# Patient Record
Sex: FEMALE | Race: WHITE | Employment: FULL TIME | URBAN - METROPOLITAN AREA
[De-identification: names, ages, dates, MRNs, and addresses within clinical notes are randomized per-mention and may not be internally consistent; named-entity substitution may affect disease eponyms.]

---

## 2020-02-23 ENCOUNTER — HOSPITAL ENCOUNTER (OUTPATIENT)
Age: 60
Discharge: HOME | End: 2020-02-23
Attending: EMERGENCY MEDICINE
Payer: COMMERCIAL

## 2020-02-23 VITALS
RESPIRATION RATE: 18 BRPM | TEMPERATURE: 98 F | HEART RATE: 96 BPM | DIASTOLIC BLOOD PRESSURE: 64 MMHG | SYSTOLIC BLOOD PRESSURE: 109 MMHG | BODY MASS INDEX: 33 KG/M2 | WEIGHT: 200 LBS | OXYGEN SATURATION: 99 %

## 2020-02-23 DIAGNOSIS — S05.01XA ABRASION OF RIGHT CORNEA, INITIAL ENCOUNTER: Primary | ICD-10-CM

## 2020-02-23 PROCEDURE — 99202 OFFICE O/P NEW SF 15 MIN: CPT

## 2020-02-23 PROCEDURE — 99203 OFFICE O/P NEW LOW 30 MIN: CPT

## 2020-02-23 RX ORDER — OFLOXACIN 3 MG/ML
1 SOLUTION/ DROPS OPHTHALMIC 4 TIMES DAILY
Qty: 1 BOTTLE | Refills: 0 | Status: SHIPPED | OUTPATIENT
Start: 2020-02-23 | End: 2020-03-01

## 2020-02-23 NOTE — ED INITIAL ASSESSMENT (HPI)
r eye pain, possible exposure to shout, states she tried to remove an eyelash with a finger while using shout spray, no direct spray to eyes, + wears glasses

## 2020-02-23 NOTE — ED PROVIDER NOTES
Patient Seen in: [address]      History   Patient presents with:   Eye Visual Problem    Stated Complaint: detergent in eye    HPI    This patient stated she touched her right eye with her finger that had a detergent prod Temp 98.4 °F (36.9 °C)   Temp src Oral   SpO2 99 %   O2 Device None (Room air)       Current:/64   Pulse 96   Temp 98.4 °F (36.9 °C) (Oral)   Resp 18   Wt 90.7 kg   SpO2 99%   BMI 33.28 kg/m²         Physical Exam    The patient is awake and alert

## 2022-05-17 NOTE — OPERATIVE REPORT
OPERATIVE REPORT   PATIENT NAME: Ro Pena  MRN: [id number]  DATE OF OPERATION: 5/17/2022  PREOPERATIVE DIAGNOSIS:   1. Pancreatic tail mass with multiple liver lesions findings worrisome for metastatic pancreatic cancer  POSTOPERATIVE DIAGNOSES:  1. Pancreatic tail mass with multiple liver lesions most consistent with metastatic pancreatic cancer. PROCEDURE PERFORMED: Upper linear endoscopic ultrasound with fine-needle aspiration  SURGEON: Stevie Thomas MD   MEDICATIONS: None    ANESTHESIA: MAC  CONSENT: Informed and obtained from the patient  SPECIMEN: Left lobe liver lesion, pancreatic tail mass  COMPLICATIONS: None immediately apparent  PROCEDURE AND FINDINGS:   After achieving adequate sedation placing the patient in the left lateral decubitus position the Olympus linear echoendoscope was introduced under direct visualization in the esophagus passed into the stomach. In the tail of pancreas there is a 2.6 x 2.4 cm hypoechoic mass highly suspicious for pancreatic malignancy. The left lobe of the liver was visualized from the stomach and contain multiple large liver lesions. Both the liver lesion as well as the pancreatic tail mass were sampled utilizing the 22-gauge [facility] [employer] fine-needle aspiration device after utilizing Doppler studies to ensure that there are no interposing blood vessels in the needle track. (1 past from the liver lesion, 2 passes from the pancreatic tail lesion). Preliminary results showed adequate material for histological examination. Multiple enlarged peripancreatic/perigastric lymphadenopathy seen. The scope at this point was removed. IMPRESSION:  1. Findings most consistent with metastatic pancreatic cancer to the liver  RECOMMENDATIONS:  1. Check final biopsy results. 2. Will refer patient to medical oncology.   Stevie Thomas MD

## 2022-05-17 NOTE — ANESTHESIA POSTPROCEDURE EVALUATION
[facility]    Isaura Cabral Patient Status:  Hospital Outpatient Surgery   Age/Gender 65year old female MRN [id number]   Location [address] [address] Attending Lima Smith MD   Hosp Day # 0 PCP Gwen Hewitt MD       Anesthesia Post-op Note    ENDOSCOPIC ULTRASOUND (EUS) WITH FINE NEEDLE ASPIRATION    Procedure Summary     Date: 05/17/22 Room / Location: [facility] ENDOSCOPY 03 / [facility] ENDOSCOPY    Anesthesia Start: 1340 Anesthesia Stop: 0536    Procedure: ENDOSCOPIC ULTRASOUND (EUS) WITH FINE NEEDLE ASPIRATION (N/A ) Diagnosis: (pamncreatic tail mass with multiple liver lesions)    Surgeons: Lima Smith MD Anesthesiologist: Zita Bills MD    Anesthesia Type: MAC ASA Status: 3          Anesthesia Type: MAC    Vitals Value Taken Time   /58 05/17/22 1524   Temp 98 05/17/22 1524   Pulse 98 05/17/22 1524   Resp 15 05/17/22 1524   SpO2 95 05/17/22 1524       Patient Location: Endoscopy    Anesthesia Type: MAC    Airway Patency: patent    Postop Pain Control: adequate    Mental Status: mildly sedated but able to meaningfully participate in the post-anesthesia evaluation    Nausea/Vomiting: none    Cardiopulmonary/Hydration status: stable euvolemic    Complications: no apparent anesthesia related complications    Postop vital signs: stable    Dental Exam: Unchanged from Preop    Patient to be discharged home when criteria met.

## 2022-06-07 NOTE — H&P
Feng Tavera is a 65year old female. No chief complaint on file.  ________________________________________________________________________  HPI:  65 f patient is here in referral from Sadie Keys and Arsenio for evaluation of pancreatic cancer for port insertion. This 71-year-old female has a history of pancreatic cancer of the tail of the pancreas, metastatic to the liver. She has been under work-up of Sadie Orlando, and has been seen by palliative care. She plans to proceed with palliative chemotherapy. Port insertion is requested. The risks, benefits, and alternatives were discussed with her and her . ALLERGIES:  Bees                    ANAPHYLAXIS  CURRENT MEDS:  No current outpatient medications on file.     ________________________________________________________________________  ROS:  GENERAL HEALTH: Pain from the cancer  HEENT: no neck pain; hearing loss negative  RESPIRATORY: denies shortness of breath, wheezing or cough   CARDIOVASCULAR: denies chest pain or RUIZ; no palpitations   GI: denies nausea, vomiting, constipation, diarrhea; no rectal bleeding  GENITAL/: no dysuria, urgency or frequency  HEMATOLOGY: denies hx anemia; denies bruising or excessive bleeding  ________________________________________________________________________  PHYSICAL EXAM:  GENERAL: well developed, well nourished female, in no apparent distress  NECK: supple; no JVD  RESPIRATORY: lungs clear to auscultation  CARDIOVASCULAR: RRR  ABDOMEN: normal active BS+, soft, nondistended; no masses; nontender  LYMPHATIC: no lymphadenopathy  MUSCULOSKELETAL: no upper or lower extremity problems  EXTREMITIES: no edema or cyanosis    Imaging and pathology reviewed.   ________________________________________________________________________  ASSESSMENT/ PLAN: Metastatic pancreatic cancer for palliative chemotherapy    Insertion of port with fluoroscopy    Risks, benefits, and alternatives were discussed with her and her , Ines Garner. We discussed bleeding, infection, deep venous thrombosis, pneumothorax, nerve injury, malposition, malfunction, removal, anesthesia, etc.     The left side was selected and initialed. Surgical prophylactic antibiotics, SCDs. All questions were answered.   Thank you for involving me in the care of your patient.  ________________________________________________________________________

## 2022-06-07 NOTE — OPERATIVE REPORT
[facility] OPERATIVE REPORT     Patient Name:  Sami Barros  MRN: [id number]    Date of Operation:  2022  Site:  [address] [place] [address]    : 3/21/1960     PREOPERATIVE DIAGNOSIS: Pancreatic cancer, liver metastasis     POSTOPERATIVE DIAGNOSIS: Same     PROCEDURE PERFORMED: Insertion of Bard Slim PowerPort with fluoroscopy (tunneled). SURGEON: Maritaz Ross MD      ASSISTANT: Ever Roldan CSA  (services required for positioning, prepping, draping, setting up the field, exposing, retracting, suturing, moving tissue, closing, tying, dressing, etc.)    ANESTHESIA: MAC.      COMPLICATIONS: None. ESTIMATED BLOOD LOSS: 5 mL. SPECIMEN: None. IMPLANT: Bard Slim ClearVue PowerPort. DRAINS: None. BLOOD PRODUCTS ADMINISTERED: None. COMPLICATIONS: None. HISTORY: This patient is a 58-year-old female who was recently diagnosed with metastatic pancreatic cancer. She requires palliative chemotherapy. The risks, benefits, and alternatives of port insertion were discussed, including bleeding, infection, deep venous thrombosis, pneumothorax, malposition, malfunction, etc. The left side was selected and marked. PROCEDURE: The patient was seen in the preoperative holding area with her . The surgical plan was reviewed. The left side was marked. She was taken to the operating room, placed in the supine position, and sedated. Her upper extremities were tucked. The chest and neck area were prepped and draped in the standard fashion. A timeout was performed. Local anesthetic was infiltrated. An incision was made over the left deltopectoral groove and carried to the chest wall. No sufficient cephalic vein was was identified for port insertion. She was placed in Trendelenburg position. Local anesthetic was infiltrated. The left subclavian vein was accessed with the wire-introducing needle on the first attempt.  The wire was advanced into the central venous system under fluoroscopic control. Using Seldinger technique, the Bard Kylee Furl catheter was introduced into the vein through the peel-away sheath and advanced under fluoroscopic guidance to the cavoatrial junction. In this position, it freely aspirated blood and was heparinized. The catheter was cut to size and secured to the port with the attachment device. The port was anchored in a subcutaneous pocket with Prolene stitches. The wound was irrigated and rendered hemostatic. It was closed in 2 layers with Vicryl. The port was accessed through the skin with a Stock[place] needle. It freely aspirated blood and was heparinized, including 1 mL of the concentrated heparin. The port was de-accessed. A Dermabond dressing was applied. The needle, sponge, and instrument counts were reported as correct. The patient was awakened and transferred to recovery. Her  was notified of the findings and her status. A chest radiograph was ordered.         Jonas Ram MD

## 2022-06-07 NOTE — INTERVAL H&P NOTE
Pre-op Diagnosis: MALIGNANT NEOPLASM OF TAIL OF PANCREAS    The above referenced H&P was reviewed by Artem Lee MD on 6/7/2022, the patient was examined and no significant changes have occurred in the patient's condition since the H&P was performed. I discussed with the patient and/or legal representative the potential benefits, risks and side effects of this procedure; the likelihood of the patient achieving goals; and potential problems that might occur during recuperation. I discussed reasonable alternatives to the procedure, including risks, benefits and side effects related to the alternatives and risks related to not receiving this procedure. We will proceed with procedure as planned. No changes today. The rationale for ports, details of the procedure, recovery aspects were reviewed with the patient and her . The left side was selected and marked. Risks, benefits, and alternatives were reviewed.

## 2022-06-07 NOTE — ANESTHESIA POSTPROCEDURE EVALUATION
[facility]    Karey Dover Patient Status:  Hospital Outpatient Surgery   Age/Gender 65year old female MRN [id number]   [facility] SURGERY Attending Chanell Yarbrough MD   [facility] Day # 0 PCP Og Martin MD       Anesthesia Post-op Note    PLACEMENT OF PORT A CATHETER UNDER FLUOROSCOPY    Procedure Summary     Date: 06/07/22 Room / Location: [address] [place] [address] OR 08 / [facility] OR    Anesthesia Start: 5843 Anesthesia Stop:     Procedure: PLACEMENT OF PORT A CATHETER UNDER FLUOROSCOPY (N/A Neck) Diagnosis: (MALIGNANT NEOPLASM OF TAIL OF PANCREAS)    Surgeons: Chanell Yarbrough MD Anesthesiologist: Alex Pal MD    Anesthesia Type: MAC ASA Status: 3          Anesthesia Type: MAC    Vitals Value Taken Time   BP 90/64 06/07/22 1510   Temp 98.2 06/07/22 1510   Pulse 82 06/07/22 1510   Resp 14 06/07/22 1510   SpO2 92 06/07/22 1510       Patient Location: Same Day Surgery    Anesthesia Type: MAC    Airway Patency: patent    Postop Pain Control: adequate    Mental Status: mildly sedated but able to meaningfully participate in the post-anesthesia evaluation    Nausea/Vomiting: none    Cardiopulmonary/Hydration status: stable euvolemic    Complications: no apparent anesthesia related complications    Postop vital signs: stable    Dental Exam: Unchanged from Preop    Patient to be discharged home when criteria met.

## 2022-08-02 NOTE — ED INITIAL ASSESSMENT (HPI)
Patient with pancreatic cancer presents with generally not feeling well. Last chemo treatment (2 rounds total) x 4 weeks ago. Denies Fever. Presents with vomiting and cough per patient. Patient alert and oriented with bilateral lower ext swelling. Patient states that she is having trouble drinking and eating.

## 2022-08-02 NOTE — ED QUICK NOTES
Orders for admission, patient is aware of plan and ready to go upstairs. Any questions, please call ED RN Torie at extension [phone]. Patient Covid vaccination status: Fully vaccinated     COVID Test Ordered in ED: Rapid SARS-CoV-2 by PCR    COVID Suspicion at Admission: N/A    Running Infusions:  None    Mental Status/LOC at time of transport:  AxO x 4  Left chest port, see MAR

## 2022-08-03 NOTE — PROGRESS NOTES
Patient situated in room, oriented to unit. Family at bedside. Admission completed. IVF started. CLD. Port accessed. Denies pain/nausea. Up with 2 and walker/rolling chair.

## 2022-08-03 NOTE — CM/SW NOTE
Patient discussed in rounds, may benefit from home health care. Attempted to meet with patient at bedside, not in room at time of visit, away at procedure. Will attempt to visit upon return. Tentative home health referral pending in [facility], need to follow up with choice list when avail, need final orders. 316pm     08/03/22 1500   CM/SW Referral Data   Referral Source Social Work (self-referral)   Reason for Referral Discharge planning   Informant Patient;Spouse/Significant Other   Pertinent Medical Hx   Does patient have an established PCP? Yes   Patient Info   Advanced directives? Yes   Patient's Current Mental Status at Time of Assessment Alert;Oriented   Patient's [address]   Number of Levels in Home 2   Number of Stair in Home 5   Patient lives with Spouse/Significant other   Patient Status Prior to Admission   Independent with ADLs and Mobility No   Pt. requires assistance with Housework;Driving;Meals; Bathing; Ambulating;Dressing; Toileting   Discharge Needs   Anticipated D/C needs To be determined     SW initiated self referral for DC planning. Met with patient and spouse, Stu Dodson at bedside, introduced self and role. Patient alert and oriented at time of visit. Patient lives with spouse at (address correct on face sheet). Patient has been very weak for the passed 2 weeks and has been requiring assistance with ADL's per patient and spouse. Patient has a cane and a walker. Reports that they ordered a transport wheel chair through [facility] but have not received it yet. Patient reports that she prefers home with home health, however is open to considering [facility] pending recs. Open to looking at home health and [facility] list.      inquired about getting a ramp installed to assist patient up and down the stairs, ISAÍAS notified that insurance does not cover ramp installation, would have to follow up in the community. ISAÍAS notified medical team, requested PT/OT evals.        PLAN: PT/OT evals pending. Open to reviewing [address] list. Prefers [facility]. Will need PASRR if [facility], HHC orders/f2f placed.          RAMÓN Bravo, [facility]    [id number]

## 2022-08-04 NOTE — CM/SW NOTE
Notified by PT that rec is home health PT. Called and spoke with spouse, Lele Hoff to notify of rec for home health. Lele Hoff understood and is in agreement to rec. Notified Lele Hoff that caregiver and additional home care recs have been left at bedside for review, Lele Hoff expressed understanding. PLAN: PT rec is home w home health. Referrals pending in [facility] for [address], orders/f2f placed.        RAMÓN Moreno, [facility]    [id number]

## 2022-08-04 NOTE — OCCUPATIONAL THERAPY NOTE
Patient off the floor for EGD- will re-attempt later schedule permitting.     [facility], OTR/L ext [phone]

## 2022-08-04 NOTE — H&P
The H&P dated 8/3/22 was reviewed by Kavin Damon MD today, the patient was examined and no significant changes have occurred in the patient's condition since the H&P was performed. I discussed with the patient and/or legal representative the potential benefits, risks and side effects of this procedure; the likelihood of the patient achieving goals; and potential problems that might occur during recuperation. I discussed reasonable alternatives to the procedure, including risks, benefits and side effects related to the alternatives and risks related to not receiving this procedure. We will proceed with procedure as planned. Informed consent was obtained for esophagogastroduodenoscopy with possible biopsy, dilation, polypectomy, therapy, banding and control of bleeding after explanation of risks, benefits and alternatives to the procedure. Risks include but not limited to bleeding, infection, perforation, missed polyps or cancer and risks of sedation.

## 2022-08-04 NOTE — HOME CARE LIAISON
Received referral via [facility] for Home Health services. Spoke w/ patient & pt's  and provided with list of [address] [address] providers from [address] [place] [address], choice is [facility] [facility]. Agency reserved in [address] [place] [address] and contact information placed on AVS.Financial interest disclosure provided. Notified SW/Lauren Baer.

## 2022-08-05 NOTE — PROGRESS NOTES
Patient alert and oriented, vital stable. Dilaudid for pain control. Zofran for nausea. IV Abx. Patient and family decided to discharge home with [facility]. All paperwork with family. Port needle removed. Machado inserted. Patient transported home via [facility].

## 2022-08-05 NOTE — CM/SW NOTE
MSW met with spouse Roberta Tuttle and patient. Hospice was explained and consents were signed. Patient will be discharged at 5:30. [facility] was called for transport. Roberta Tuttle was updated and is going home to make room for hospice equipment, which was ordered by hospice nurse.    Jeff Orosco, [address] [place] [address]  [phone]

## 2022-08-05 NOTE — CM/SW NOTE
Received notice from MD of hospice consult. Called and notified Aguila Sanders of [facility]. Referral with order sent through to [facility].      RAMÓN Kraft, [facility]    [id number]

## 2024-08-22 NOTE — ANESTHESIA POSTPROCEDURE EVALUATION
Patient: Maria Elena Mendez    Procedure Summary     Date: 08/04/22 Room / Location: [address] [place] [address] ENDOSCOPY 05 / [address] [place] [address] ENDOSCOPY    Anesthesia Start: 6719 Anesthesia Stop: 9391    Procedure: ESOPHAGOGASTRODUODENOSCOPY (EGD) (N/A ) Diagnosis: (HIATAL HERNIA, GASTRITIS, schatzskis ring)    Surgeons: Gillian Barros MD Anesthesiologist: Mohini Palomo CRNA    Anesthesia Type: general ASA Status: 3          Anesthesia Type: general    Vitals Value Taken Time   /94 08/04/22 1202   Temp  08/04/22 1203   Pulse 92 08/04/22 1202   Resp 18 08/04/22 1202   SpO2 96 % 08/04/22 1202       [address] [place] [address] AN Post Evaluation:   Patient Evaluated in PACU  Patient Participation: complete - patient participated  Level of Consciousness: sleepy but conscious  Pain Score: 0  Pain Management: adequate  Airway Patency:patent  Dental exam unchanged from preop  Yes    Cardiovascular Status: acceptable  Respiratory Status: acceptable  Postoperative Hydration acceptable      Mohini Palomo CRNA  8/4/2022 12:03 PM never used

## (undated) NOTE — IP AVS SNAPSHOT
[facility]            (For Outpatient Use Only) Initial Admit Date: 2022   Inpt/Obs Admit Date: Inpt: 22 / Obs: N/A   Discharge Date:    Laura Haily:  [de-identified]   MRN: [de-identified]   CSN: [id number]   CEID: [id number]        ENCOUNTER  Patient Class: Inpatient Admitting Provider: Steph Harris DO Unit: [address]SE   Hospital Service: Oncology Attending Provider: Ilda Melton MD   Bed: 459-A   Visit Type:   Referring Physician: No ref. provider found Billing Flag:    Admit Diagnosis: Dehydration [E86.0]      PATIENT  Legal Name:   Abbey Beltran   Legal Sex: Female  Gender ID: Female             [address] [facility] Name:    PCP:  Sonu Smith MD Home: [phone]   Address:  [address] [place] [address] : 3/21/1960 (62 yrs) Mobile: [phone]         City/State/Zip: [place] [address] Marital:  Language: Karina park: Tez SSN4: [id number] Latter-day: [address] [place] [address]*     Race: White Ethnicity: Non  Or [address] [place] [address]   Name Relationship Legal Guardian? Home Phone Work Phone Mobile Phone   1. Jonas Rios  2.  Finas Ast  Mother    [phone]       GUARANTOR  Guarantor: Abbey Beltran : 3/21/1960 Home Phone: [phone]   Address: [address] [place] [address] Rd  Sex: Female Work Phone: [phone]   City/State/Zip: [place] [address]   Rel. to Patient: Self Guarantor ID: [id number]   GUARANTOR EMPLOYER   Employer: [employer] Status: FULL TIME     COVERAGE  PRIMARY INSURANCE   Payor: [address] [place] [address] HM Plan: [facility]    Group Number: [id number] Insurance Type: [address] Name: Marla Conn : 1960   Subscriber ID: [id number] Pt Rel to Subscriber: Self   SECONDARY INSURANCE   Payor:  Plan:    Group Number:  Insurance Type:    Subscriber Name:  Subscriber :    Subscriber ID:  Pt Rel to Subscriber:    TERTIARY INSURANCE   Payor:  Plan: Group Number:  Insurance Type:    Subscriber Name:  Subscriber :    Subscriber ID:  Pt Rel to Subscriber:    Hospital Account Financial Class: [facility]    2022

## (undated) NOTE — LETTER
[address] [place] [address]      Authorization for Surgical Operation and Procedure     Date:___________                                                                                                         Time:__________  1. I hereby Destin Cain MD, my physician and his/her assistants (if applicable), which may include medical students, residents, and/or fellows, to perform the following surgical operation/ procedure and administer such anesthesia as may be determined necessary by my physician:  Operation/Procedure name (s) ESOPHAGOGASTRODUODENOSCOPY (EGD)  on Jeanne L Christel-Grenke   2. I recognize that during the surgical operation/procedure, unforeseen conditions may necessitate additional or different procedures than those listed above. I, therefore, further authorize and request that the above-named surgeon, assistants, or designees perform such procedures as are, in their judgment, necessary and desirable. 3.   My surgeon/physician has discussed prior to my surgery the potential benefits, risks and side effects of this procedure; the likelihood of achieving goals; and potential problems that might occur during recuperation. They also discussed reasonable alternatives to the procedure, including risks, benefits, and side effects related to the alternatives and risks related to not receiving this procedure. I have had all my questions answered and I acknowledge that no guarantee has been made as to the result that may be obtained. 4.   Should the need arise during my operation or immediate post-operative period, I also consent to the administration of blood and/or blood products. Further, I understand that despite careful testing and screening of blood or blood products by collecting agencies, I may still be subject to ill effects as a result of receiving a blood transfusion and/or blood products.   The following are some, but not all, of the potential risks that can occur: fever and allergic reactions, hemolytic reactions, transmission of diseases such as Hepatitis, AIDS and Cytomegalovirus (CMV) and fluid overload. In the event that I wish to have an autologous transfusion of my own blood, or a directed donor transfusion. I will discuss this with my physician. 5.   I authorize the use of any specimen, organs, tissues, body parts or foreign objects that may be removed from my body during the operation/procedure for diagnosis, research or teaching purposes and their subsequent disposal by hospital authorities. I also authorize the release of specimen test results and/or written reports to my treating physician on the hospital medical staff or other referring or consulting physicians involved in my care, at the discretion of the Pathologist or my treating physician. 6.   I consent to the photographing or videotaping of the operations or procedures to be performed, including appropriate portions of my body for medical, scientific, or educational purposes, provided my identity is not revealed by the pictures or by descriptive texts accompanying them. If the procedure has been photographed/videotaped, the surgeon will obtain the original picture, image, videotape or CD. The hospital will not be responsible for storage, release or maintenance of the picture, image, tape or CD.    7.   I consent to the presence of a  or observers in the operating room as deemed necessary by my physician or their designees. 8.   I recognize that in the event my procedure results in extended X-Ray/fluoroscopy time, I may develop a skin reaction. 9. If I have a Do Not Attempt Resuscitation (DNAR) order in place, that status will be suspended while in the operating room, procedural suite, and during the recovery period unless otherwise explicitly stated by me (or a person authorized to consent on my behalf).  The surgeon or my attending physician will determine when the applicable recovery period ends for purposes of reinstating the DNAR order. 10. Patients having a sterilization procedure: I understand that if the procedure is successful the results will be permanent and it will therefore be impossible for me to inseminate, conceive, or bear children. I also understand that the procedure is intended to result in sterility, although the result has not been guaranteed. 11. I acknowledge that my physician has explained sedation/analgesia administration to me including the risk and benefits I consent to the administration of sedation/analgesia as may be necessary or desirable in the judgment of my physician. I CERTIFY THAT I HAVE READ AND FULLY UNDERSTAND THE ABOVE CONSENT TO OPERATION and/or OTHER PROCEDURE.    _________________________________________  __________________________________  Signature of Patient     Signature of Responsible Person         ___________________________________         Printed Name of Responsible Person           _________________________________                  Relationship to Patient  _________________________________________  ______________________________  Signature of Witness          Date  Time    STATEMENT OF PHYSICIAN My signature below affirms that prior to the time of the procedure; I have explained to the patient and/or his/her legal representative, the risks and benefits involved in the proposed treatment and any reasonable alternative to the proposed treatment. I have also explained the risks and benefits involved in refusal of the proposed treatment and alternatives to the proposed treatment and have answered the patient's questions. If I have a significant financial interest in a co-management agreement or a significant financial interest in any product or implant, or other significant relationship used in this procedure/surgery, I have disclosed this and had a discussion with my patient. _______________________________________________________________ _____________________________  Tatyana Medellin Physician)                                                                                         (Date)                                   (Time)        Patient Name: Camilla Gan    : 3/21/1960   Printed: 2022      Medical Record #: [id number]                                              Page 1